# Patient Record
Sex: FEMALE | Race: WHITE | Employment: FULL TIME | URBAN - METROPOLITAN AREA
[De-identification: names, ages, dates, MRNs, and addresses within clinical notes are randomized per-mention and may not be internally consistent; named-entity substitution may affect disease eponyms.]

---

## 2022-12-22 LAB — MAMMOGRAPHY, EXTERNAL: NORMAL

## 2023-09-03 ENCOUNTER — APPOINTMENT (OUTPATIENT)
Dept: GENERAL RADIOLOGY | Age: 68
End: 2023-09-03
Payer: MEDICARE

## 2023-09-03 ENCOUNTER — HOSPITAL ENCOUNTER (EMERGENCY)
Age: 68
Discharge: HOME | End: 2023-09-03
Attending: GENERAL PRACTICE
Payer: MEDICARE

## 2023-09-03 VITALS
BODY MASS INDEX: 27.6 KG/M2 | RESPIRATION RATE: 17 BRPM | OXYGEN SATURATION: 100 % | SYSTOLIC BLOOD PRESSURE: 128 MMHG | WEIGHT: 150 LBS | HEIGHT: 62 IN | TEMPERATURE: 98.4 F | DIASTOLIC BLOOD PRESSURE: 79 MMHG | HEART RATE: 74 BPM

## 2023-09-03 DIAGNOSIS — S29.019A THORACIC MYOFASCIAL STRAIN, INITIAL ENCOUNTER: Primary | ICD-10-CM

## 2023-09-03 PROCEDURE — 99283 EMERGENCY DEPT VISIT LOW MDM: CPT

## 2023-09-03 PROCEDURE — 72072 X-RAY EXAM THORAC SPINE 3VWS: CPT

## 2023-09-03 RX ORDER — MECLIZINE HYDROCHLORIDE 25 MG/1
25 TABLET ORAL 3 TIMES DAILY PRN
COMMUNITY

## 2023-09-03 RX ORDER — CETIRIZINE HYDROCHLORIDE 10 MG/1
10 TABLET ORAL DAILY
COMMUNITY
Start: 2023-03-09

## 2023-09-03 RX ORDER — AMLODIPINE BESYLATE 2.5 MG/1
TABLET ORAL
COMMUNITY
Start: 2023-07-14

## 2023-09-03 RX ORDER — TIZANIDINE 2 MG/1
2 TABLET ORAL 3 TIMES DAILY PRN
Qty: 30 TABLET | Refills: 0 | Status: SHIPPED | OUTPATIENT
Start: 2023-09-03

## 2023-09-03 RX ORDER — FLUTICASONE PROPIONATE 50 MCG
2 SPRAY, SUSPENSION (ML) NASAL DAILY
COMMUNITY
Start: 2023-03-09

## 2023-09-03 RX ORDER — FERROUS SULFATE 137(45) MG
1 TABLET, EXTENDED RELEASE ORAL DAILY
COMMUNITY
Start: 2023-02-07

## 2023-09-03 ASSESSMENT — PAIN SCALES - GENERAL: PAINLEVEL_OUTOF10: 8

## 2023-09-03 ASSESSMENT — PAIN - FUNCTIONAL ASSESSMENT: PAIN_FUNCTIONAL_ASSESSMENT: 0-10

## 2023-09-03 ASSESSMENT — LIFESTYLE VARIABLES: HOW OFTEN DO YOU HAVE A DRINK CONTAINING ALCOHOL: NEVER

## 2023-09-03 NOTE — ED PROVIDER NOTES
Emergency Department Provider Note       PCP: Cynthia Bolivar MD   Age: 77 y.o. Sex: female     DISPOSITION Decision To Discharge 09/03/2023 11:32:00 AM       ICD-10-CM    1. Thoracic myofascial strain, initial encounter  S29.019A           Medical Decision Making     Complexity of Problems Addressed:  1 acute uncomplicated injury    Data Reviewed and Analyzed:  I independently ordered and reviewed each unique test.  I reviewed external records: provider visit note from PCP. I reviewed telemedicine family medicine note from August 21, 2023      I interpreted the X-rays T-spine x-ray shows no evidence of fracture or lesion or subluxation, I have reviewed the radiology report and agree. Discussion of management or test interpretation. Patient presents with thoracic back pain. It is consistent with more of a muscle strain. There is nothing to suggest back pain emergency such as cauda equina, spinal epidural abscess or cardiopulmonary emergency radiating to the back such as TAD, PE, pneumonia, or any other emergent pathology. Patient has remained stable here and will be treated with muscle relaxers. Patient to follow-up with primary care and strict return precautions are given. Risk of Complications and/or Morbidity of Patient Management:  Prescription drug management performed. Shared medical decision making was utilized in creating the patients health plan today. History      Sp Molina is a 77 y.o. female who presents to the Emergency Department with chief complaint of    Chief Complaint   Patient presents with    Back Pain      Patient presents with upper thoracic spine pain. It is a little bit more over to the right side. She has had it for 4 days. It is worsened with certain position changes and neck movements and arm movements. She denies any weakness or numbness to her extremities. She denies any saddle anesthesia or bowel and bladder change. She denies fevers.   She denies any

## 2023-09-03 NOTE — ED TRIAGE NOTES
Pt c/o pain in her upper right back for 4 days that she has been using ibuprofen and a lidocaine patch to without relief. Pain is worse with movement. Denies CP or SOB.

## 2024-03-03 ENCOUNTER — HOSPITAL ENCOUNTER (EMERGENCY)
Age: 69
Discharge: HOME | End: 2024-03-03
Attending: EMERGENCY MEDICINE
Payer: MEDICARE

## 2024-03-03 VITALS
WEIGHT: 150 LBS | DIASTOLIC BLOOD PRESSURE: 74 MMHG | RESPIRATION RATE: 18 BRPM | TEMPERATURE: 98.9 F | HEART RATE: 65 BPM | HEIGHT: 62 IN | BODY MASS INDEX: 27.6 KG/M2 | OXYGEN SATURATION: 99 % | SYSTOLIC BLOOD PRESSURE: 120 MMHG

## 2024-03-03 DIAGNOSIS — J02.9 SORE THROAT: Primary | ICD-10-CM

## 2024-03-03 LAB
FLUAV RNA SPEC QL NAA+PROBE: NOT DETECTED
FLUBV RNA SPEC QL NAA+PROBE: NOT DETECTED
SARS-COV-2 RDRP RESP QL NAA+PROBE: NOT DETECTED
SOURCE: NORMAL
STREP, MOLECULAR: NOT DETECTED

## 2024-03-03 PROCEDURE — 87502 INFLUENZA DNA AMP PROBE: CPT

## 2024-03-03 PROCEDURE — 87651 STREP A DNA AMP PROBE: CPT

## 2024-03-03 PROCEDURE — 99283 EMERGENCY DEPT VISIT LOW MDM: CPT

## 2024-03-03 PROCEDURE — 87635 SARS-COV-2 COVID-19 AMP PRB: CPT

## 2024-03-03 ASSESSMENT — LIFESTYLE VARIABLES
HOW OFTEN DO YOU HAVE A DRINK CONTAINING ALCOHOL: NEVER
HOW MANY STANDARD DRINKS CONTAINING ALCOHOL DO YOU HAVE ON A TYPICAL DAY: PATIENT DOES NOT DRINK

## 2024-03-03 ASSESSMENT — PAIN - FUNCTIONAL ASSESSMENT
PAIN_FUNCTIONAL_ASSESSMENT: NONE - DENIES PAIN
PAIN_FUNCTIONAL_ASSESSMENT: NONE - DENIES PAIN

## 2024-03-03 NOTE — ED PROVIDER NOTES
Extraocular Movements: Extraocular movements intact.      Conjunctiva/sclera: Conjunctivae normal.      Pupils: Pupils are equal, round, and reactive to light.   Neck:      Comments: Trachea midline.  No goiter noted. No significant cervical lymphadenopathy noted.  Full range of motion.  No nuchal rigidity  Cardiovascular:      Rate and Rhythm: Normal rate.      Pulses: Normal pulses.   Pulmonary:      Effort: Pulmonary effort is normal.      Breath sounds: Normal breath sounds.      Comments: CTAB.   Abdominal:      Palpations: Abdomen is soft.      Tenderness: There is no abdominal tenderness. There is no guarding or rebound.   Musculoskeletal:         General: No swelling. Normal range of motion.      Cervical back: Normal range of motion. No rigidity or tenderness.   Skin:     General: Skin is warm.      Findings: No erythema or rash.   Neurological:      General: No focal deficit present.      Mental Status: She is alert and oriented to person, place, and time.      Cranial Nerves: No cranial nerve deficit.      Sensory: No sensory deficit.      Motor: No weakness.        Procedures     Procedures    Orders Placed This Encounter   Procedures    COVID-19, Rapid    Influenza A/B, Molecular    Group A Strep Screen By PCR    [facility] - June Quan, DO, Otolaryngology, [place]        Medications given during this emergency department visit:  Medications - No data to display    New Prescriptions    No medications on file        Past Medical History:   Diagnosis Date    Breast cancer (HCC)     Hypertension         Past Surgical History:   Procedure Laterality Date     SECTION          Social History     Socioeconomic History    Marital status:    Tobacco Use    Smoking status: Never    Smokeless tobacco: Never   Substance and Sexual Activity    Alcohol use: Never    Drug use: Never        Previous Medications    AMLODIPINE (NORVASC) 2.5 MG TABLET        CALCIUM CARB-CHOLECALCIFEROL 600-20

## 2024-03-03 NOTE — ED TRIAGE NOTES
Patient states swelling in throat x14 days. Denies pain. Denies n/v/d. States of fatigue. Denies fever/chills. Denies gu complications. States recent round of prednisone for situation.

## 2024-03-03 NOTE — DISCHARGE INSTRUCTIONS
Schedule close follow-up with ENT for further evaluation.  Return to ED if symptoms worsen or progress in any way

## 2024-03-03 NOTE — ED NOTES
I have reviewed discharge instructions with the patient.  The patient verbalized understanding.    Patient left ED via Discharge Method: ambulatory to Home with spouse.    Opportunity for questions and clarification provided.       Patient given 0 scripts.         To continue your aftercare when you leave the hospital, you may receive an automated call from our care team to check in on how you are doing.  This is a free service and part of our promise to provide the best care and service to meet your aftercare needs.” If you have questions, or wish to unsubscribe from this service please call [phone].  Thank you for Choosing our [facility] Emergency Department.

## 2024-03-28 ENCOUNTER — OFFICE VISIT (OUTPATIENT)
Dept: ENT CLINIC | Age: 69
End: 2024-03-28
Payer: MEDICARE

## 2024-03-28 VITALS
HEIGHT: 62 IN | WEIGHT: 157 LBS | SYSTOLIC BLOOD PRESSURE: 120 MMHG | DIASTOLIC BLOOD PRESSURE: 78 MMHG | BODY MASS INDEX: 28.89 KG/M2 | RESPIRATION RATE: 19 BRPM

## 2024-03-28 DIAGNOSIS — E04.2 MULTIPLE THYROID NODULES: Primary | ICD-10-CM

## 2024-03-28 DIAGNOSIS — R09.A2 GLOBUS SENSATION: ICD-10-CM

## 2024-03-28 DIAGNOSIS — R49.0 DYSPHONIA: ICD-10-CM

## 2024-03-28 PROCEDURE — 1090F PRES/ABSN URINE INCON ASSESS: CPT | Performed by: STUDENT IN AN ORGANIZED HEALTH CARE EDUCATION/TRAINING PROGRAM

## 2024-03-28 PROCEDURE — 31575 DIAGNOSTIC LARYNGOSCOPY: CPT | Performed by: STUDENT IN AN ORGANIZED HEALTH CARE EDUCATION/TRAINING PROGRAM

## 2024-03-28 PROCEDURE — 1123F ACP DISCUSS/DSCN MKR DOCD: CPT | Performed by: STUDENT IN AN ORGANIZED HEALTH CARE EDUCATION/TRAINING PROGRAM

## 2024-03-28 PROCEDURE — G8427 DOCREV CUR MEDS BY ELIG CLIN: HCPCS | Performed by: STUDENT IN AN ORGANIZED HEALTH CARE EDUCATION/TRAINING PROGRAM

## 2024-03-28 PROCEDURE — G8484 FLU IMMUNIZE NO ADMIN: HCPCS | Performed by: STUDENT IN AN ORGANIZED HEALTH CARE EDUCATION/TRAINING PROGRAM

## 2024-03-28 PROCEDURE — 99204 OFFICE O/P NEW MOD 45 MIN: CPT | Performed by: STUDENT IN AN ORGANIZED HEALTH CARE EDUCATION/TRAINING PROGRAM

## 2024-03-28 PROCEDURE — 1036F TOBACCO NON-USER: CPT | Performed by: STUDENT IN AN ORGANIZED HEALTH CARE EDUCATION/TRAINING PROGRAM

## 2024-03-28 PROCEDURE — 3017F COLORECTAL CA SCREEN DOC REV: CPT | Performed by: STUDENT IN AN ORGANIZED HEALTH CARE EDUCATION/TRAINING PROGRAM

## 2024-03-28 PROCEDURE — G8419 CALC BMI OUT NRM PARAM NOF/U: HCPCS | Performed by: STUDENT IN AN ORGANIZED HEALTH CARE EDUCATION/TRAINING PROGRAM

## 2024-03-28 PROCEDURE — G8400 PT W/DXA NO RESULTS DOC: HCPCS | Performed by: STUDENT IN AN ORGANIZED HEALTH CARE EDUCATION/TRAINING PROGRAM

## 2024-03-28 ASSESSMENT — ENCOUNTER SYMPTOMS
COUGH: 0
SINUS PRESSURE: 0
VOICE CHANGE: 1
EYE PAIN: 0
WHEEZING: 0
EYE ITCHING: 0
APNEA: 0
NAUSEA: 0
CONSTIPATION: 0
DIARRHEA: 0
STRIDOR: 0
SHORTNESS OF BREATH: 0
FACIAL SWELLING: 0
SINUS PAIN: 0
EYE DISCHARGE: 0
CHOKING: 0
SORE THROAT: 1

## 2024-03-28 NOTE — PROGRESS NOTES
HPI:  Ila Cancino is a 69 y.o. female seen New    Chief Complaint   Patient presents with    Sore Throat     Patient presents today with c/o change of voice , sore throat , and sensation of swallowing her tonsils . Patient states that she has history of thyroid nodules . She feels as if something is present in the R side of her throat .        69-year-old female seen for a new patient referral evaluation concern of throat symptoms.  She has had a noticeable change to her voice over the last year.  She has also had some intermittent sensation of feeling swelling or tightness to her throat and a sensation 3 weeks ago as if she was swallowing her tonsils.  This was worse on the right as compared to the left.  Became so pronounced that she went to the ER for evaluation but was told that there was a normal exam.  This is subsided somewhat but still having a globus type sensation to the right side of her throat and the chronic hoarseness changes.  She also has a history of thyroid nodules bilaterally which have been monitored with serial ultrasounds for the last 4 years.  She had an initial biopsy 4 years ago and updated repeat biopsies due to enlargement of nodules in December 2023.  This was performed in the [facility] system and record review shows a right thyroid nodule or cyst with 2 mL aspirated final pathology nondiagnostic and a left-sided thyroid nodule [place] 2 benign.  She notes a family history of her mother having thyroid carcinoma.    Past Medical History, Past Surgical History, Family history, Social History, and Medications were all reviewed with the patient today and updated as necessary.     Allergies   Allergen Reactions    Codeine Nausea Only    Tramadol     Meperidine Nausea And Vomiting     Other reaction(s): Nausea and Vomiting         Patient Active Problem List   Diagnosis    Shortness of breath    Obesity       Current Outpatient Medications   Medication Sig    amLODIPine (NORVASC)

## 2024-07-23 ENCOUNTER — OFFICE VISIT (OUTPATIENT)
Dept: ENDOCRINOLOGY | Age: 69
End: 2024-07-23
Payer: MEDICARE

## 2024-07-23 VITALS
SYSTOLIC BLOOD PRESSURE: 120 MMHG | WEIGHT: 149 LBS | DIASTOLIC BLOOD PRESSURE: 78 MMHG | HEART RATE: 64 BPM | BODY MASS INDEX: 27.25 KG/M2

## 2024-07-23 DIAGNOSIS — E04.2 MULTINODULAR GOITER: ICD-10-CM

## 2024-07-23 DIAGNOSIS — Z80.8 FAMILY HISTORY OF THYROID CANCER: ICD-10-CM

## 2024-07-23 DIAGNOSIS — E04.2 MULTINODULAR GOITER: Primary | ICD-10-CM

## 2024-07-23 LAB — TSH W FREE THYROID IF ABNORMAL: 1.28 UIU/ML (ref 0.27–4.2)

## 2024-07-23 PROCEDURE — G8419 CALC BMI OUT NRM PARAM NOF/U: HCPCS | Performed by: INTERNAL MEDICINE

## 2024-07-23 PROCEDURE — 1090F PRES/ABSN URINE INCON ASSESS: CPT | Performed by: INTERNAL MEDICINE

## 2024-07-23 PROCEDURE — G8427 DOCREV CUR MEDS BY ELIG CLIN: HCPCS | Performed by: INTERNAL MEDICINE

## 2024-07-23 PROCEDURE — 99204 OFFICE O/P NEW MOD 45 MIN: CPT | Performed by: INTERNAL MEDICINE

## 2024-07-23 PROCEDURE — 1036F TOBACCO NON-USER: CPT | Performed by: INTERNAL MEDICINE

## 2024-07-23 PROCEDURE — 3017F COLORECTAL CA SCREEN DOC REV: CPT | Performed by: INTERNAL MEDICINE

## 2024-07-23 PROCEDURE — G8400 PT W/DXA NO RESULTS DOC: HCPCS | Performed by: INTERNAL MEDICINE

## 2024-07-23 PROCEDURE — 76536 US EXAM OF HEAD AND NECK: CPT | Performed by: INTERNAL MEDICINE

## 2024-07-23 PROCEDURE — 1123F ACP DISCUSS/DSCN MKR DOCD: CPT | Performed by: INTERNAL MEDICINE

## 2024-07-23 ASSESSMENT — ENCOUNTER SYMPTOMS
DIARRHEA: 0
CONSTIPATION: 1

## 2024-07-23 NOTE — PROGRESS NOTES
Systems   Constitutional:  Positive for fatigue and unexpected weight change (increased 5-10 pounds). Negative for diaphoresis.   Cardiovascular:  Positive for palpitations (occasionally).        She has paroxysmal atrial fibrillation (followed by Dr. Meeks).   Gastrointestinal:  Positive for constipation (intermittent). Negative for diarrhea.   Endocrine: Negative for cold intolerance and heat intolerance.   Neurological:  Negative for tremors.   Psychiatric/Behavioral:  Positive for sleep disturbance (she has MEHRAN and tries to wear CPAP).        Vital Signs:  /78 (Site: Right Upper Arm, Position: Sitting, Cuff Size: Large Adult)   Pulse 64   Wt 67.6 kg (149 lb)   BMI 27.25 kg/m²     Physical Exam  Constitutional:       General: She is not in acute distress.  Neck:      Comments: 2 cm firm right thyroid nodule.  Left lobe of thyroid is full.  Cardiovascular:      Rate and Rhythm: Normal rate and regular rhythm.   Lymphadenopathy:      Cervical: No cervical adenopathy.   Neurological:      Motor: No tremor.           Orders Placed This Encounter   Procedures    TSH with Reflex     Standing Status:   Future     Standing Expiration Date:   7/23/2025    CHG US SOFT TISSUE HEAD & NECK REAL TIME IMGE DOCM       Current Outpatient Medications   Medication Sig Dispense Refill    amLODIPine (NORVASC) 2.5 MG tablet       Calcium Carb-Cholecalciferol 600-20 MG-MCG CHEW Take by mouth daily      cetirizine (ZYRTEC) 10 MG tablet Take 1 tablet by mouth daily      fluticasone (FLONASE) 50 MCG/ACT nasal spray 2 sprays by Nasal route daily      ferrous sulfate (SLOW FE) 142 (45 Fe) MG extended release tablet Take 142 mg by mouth daily      meclizine (ANTIVERT) 25 MG tablet Take 1 tablet by mouth 3 times daily as needed      tiZANidine (ZANAFLEX) 2 MG tablet Take 1 tablet by mouth 3 times daily as needed (muscle spasm) 30 tablet 0     No current facility-administered medications for this visit.

## 2025-07-23 ENCOUNTER — OFFICE VISIT (OUTPATIENT)
Dept: ENDOCRINOLOGY | Age: 70
End: 2025-07-23
Payer: MEDICARE

## 2025-07-23 VITALS
SYSTOLIC BLOOD PRESSURE: 126 MMHG | DIASTOLIC BLOOD PRESSURE: 68 MMHG | HEIGHT: 62 IN | OXYGEN SATURATION: 98 % | RESPIRATION RATE: 16 BRPM | BODY MASS INDEX: 28.34 KG/M2 | WEIGHT: 154 LBS | HEART RATE: 72 BPM

## 2025-07-23 DIAGNOSIS — Z80.8 FAMILY HISTORY OF THYROID CANCER: ICD-10-CM

## 2025-07-23 DIAGNOSIS — E04.2 MULTINODULAR GOITER: Primary | ICD-10-CM

## 2025-07-23 PROCEDURE — 99213 OFFICE O/P EST LOW 20 MIN: CPT | Performed by: INTERNAL MEDICINE

## 2025-07-23 PROCEDURE — G8400 PT W/DXA NO RESULTS DOC: HCPCS | Performed by: INTERNAL MEDICINE

## 2025-07-23 PROCEDURE — 76536 US EXAM OF HEAD AND NECK: CPT | Performed by: INTERNAL MEDICINE

## 2025-07-23 PROCEDURE — G8419 CALC BMI OUT NRM PARAM NOF/U: HCPCS | Performed by: INTERNAL MEDICINE

## 2025-07-23 PROCEDURE — 1159F MED LIST DOCD IN RCRD: CPT | Performed by: INTERNAL MEDICINE

## 2025-07-23 PROCEDURE — 1123F ACP DISCUSS/DSCN MKR DOCD: CPT | Performed by: INTERNAL MEDICINE

## 2025-07-23 PROCEDURE — 1090F PRES/ABSN URINE INCON ASSESS: CPT | Performed by: INTERNAL MEDICINE

## 2025-07-23 PROCEDURE — 1036F TOBACCO NON-USER: CPT | Performed by: INTERNAL MEDICINE

## 2025-07-23 PROCEDURE — G8427 DOCREV CUR MEDS BY ELIG CLIN: HCPCS | Performed by: INTERNAL MEDICINE

## 2025-07-23 PROCEDURE — 3017F COLORECTAL CA SCREEN DOC REV: CPT | Performed by: INTERNAL MEDICINE

## 2025-07-23 RX ORDER — OMEPRAZOLE 40 MG/1
40 CAPSULE, DELAYED RELEASE ORAL DAILY
COMMUNITY
Start: 2025-04-14

## 2025-07-23 ASSESSMENT — ENCOUNTER SYMPTOMS
DIARRHEA: 0
CONSTIPATION: 1

## 2025-07-23 NOTE — PROGRESS NOTES
Bryan Torres MD, [facility]  [address] [place] [address], [address]  [place] [address]  Phone [phone]  Facsimile [phone]          Ila Cancino is a 70 y.o. female seen 7/23/2025 for follow up of multinodular goiter        ASSESSMENT AND PLAN:    1. Multinodular goiter  She has a history of a multinodular goiter diagnosed in 2019.  She is status post benign FNA biopsy of the dominant left lobe nodule 1/2020.  She is status post nondiagnostic FNA biopsy of the superior right lobe nodule 8/2021.  She is status post nondiagnostic FNA biopsy of the inferior right lobe nodule 12/2023.  She is status post benign FNA biopsy of the inferior left lobe nodule 12/2023.  Thyroid ultrasound performed today revealed that the nodules were fairly stable in size and I have reassured her that an FNA biopsy is not currently indicated.  I will have her return in 2 years for a follow-up ultrasound to document stability.  She has no compressive symptoms at this point which would warrant thyroidectomy.  She was biochemically euthyroid 6/2025.    2. Family history of thyroid cancer  She has a positive family history of thyroid cancer in her mother and should therefore likely have lifelong ultrasound surveillance.            Procedures:    Thyroid ultrasound 7/23/2025: Right lobe 2.04 x 1.80 x 4.62 cm.  In the superior right lobe there is a solid fairly isoechoic nodule measuring 1.34 x 1.44 x 1.76 cm without microcalcifications (TR 3).  In the mid right lobe anteriorly there is a solid hypoechoic nodule measuring 0.36 x 0.60 x 0.58 cm (TR 4).  In the mid to inferior right lobe there is a complex isoechoic nodule measuring 1.57 x 1.81 x 2.71 cm without microcalcifications (TR 2).  In the inferior right lobe there is a complex isoechoic nodule measuring 1.11 x 1.47 x 1.59 cm (TR 2).  Isthmus measures 0.23 cm.  Left lobe 1.94 x 1.89 x 4.92 cm.  In the superior left lobe there is a